# Patient Record
Sex: FEMALE | Race: ASIAN | NOT HISPANIC OR LATINO | URBAN - METROPOLITAN AREA
[De-identification: names, ages, dates, MRNs, and addresses within clinical notes are randomized per-mention and may not be internally consistent; named-entity substitution may affect disease eponyms.]

---

## 2024-08-30 ENCOUNTER — EMERGENCY (EMERGENCY)
Facility: HOSPITAL | Age: 49
LOS: 1 days | Discharge: HOME | End: 2024-08-30
Attending: STUDENT IN AN ORGANIZED HEALTH CARE EDUCATION/TRAINING PROGRAM | Admitting: STUDENT IN AN ORGANIZED HEALTH CARE EDUCATION/TRAINING PROGRAM
Payer: SELF-PAY

## 2024-08-30 VITALS
WEIGHT: 160.06 LBS | DIASTOLIC BLOOD PRESSURE: 82 MMHG | OXYGEN SATURATION: 100 % | RESPIRATION RATE: 18 BRPM | SYSTOLIC BLOOD PRESSURE: 146 MMHG | HEART RATE: 100 BPM | TEMPERATURE: 98 F

## 2024-08-30 PROCEDURE — 99283 EMERGENCY DEPT VISIT LOW MDM: CPT
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
## 2024-08-30 NOTE — ED ADULT TRIAGE NOTE - PRO INTERPRETER NEED 2
English Dermal Autograft Text: The defect edges were debeveled with a #15 scalpel blade.  Given the location of the defect, shape of the defect and the proximity to free margins a dermal autograft was deemed most appropriate.  Using a sterile surgical marker, the primary defect shape was transferred to the donor site. The area thus outlined was incised deep to adipose tissue with a #15 scalpel blade.  The harvested graft was then trimmed of adipose and epidermal tissue until only dermis was left.  The skin graft was then placed in the primary defect and oriented appropriately.
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
## 2024-08-30 NOTE — ED PROVIDER NOTE - CONSTITUTIONAL, MLM
normal... Well appearing, awake, alert, oriented to person, place, time/situation and in no apparent distress. No signs of basilar skull fracture, no step offs

## 2024-08-30 NOTE — ED PROVIDER NOTE - PHYSICAL EXAMINATION
head: No septal hematoma, TM x2 clear w/o e/o hemotypanum or CSF rhinorrhea, no greco sign, raccoon eyes or csf rhinorrhea, no e/o entrapment, no diplopia on EOM, PERRL, EOMI. No facial tenderness over zygoma, mandible or maxilla. TMJ intact. Midface stable. Nose midline without significant deviation. No ML C-spine TTP

## 2024-08-30 NOTE — ED PROVIDER NOTE - CARE PLAN
Principal Discharge DX:	Abrasion of forehead   1 Principal Discharge DX:	Abrasion of forehead  Secondary Diagnosis:	Closed head injury

## 2024-08-30 NOTE — ED PROVIDER NOTE - SKIN WOUND TYPE
3 cm curvilinear laceration to forehead, not actively bleeding, well approximated/ABRASION(S) 3 cm curvilinear superficial laceration to forehead, not actively bleeding, well approximated/ABRASION(S)
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
## 2024-08-30 NOTE — ED PROVIDER NOTE - NSFOLLOWUPINSTRUCTIONS_ED_ALL_ED_FT
You have been evaluated in the Emergency Department today for Skin abrasion of your forehead.    Please follow up with your primary care physician within two days.    Return to the Emergency Department if you experience worsening headache, numbness/tingling/difficulty with speech, facial droop, or any other concerning symptoms.     You make take Tylenol at home as appropriate for headache.

## 2024-08-30 NOTE — ED PROVIDER NOTE - PATIENT PORTAL LINK FT
You can access the FollowMyHealth Patient Portal offered by [facility] by registering at the following website: [email or web address]. By joining [employer]’s FollowMyHealth portal, you will also be able to view your health information using other applications (apps) compatible with our system.

## 2024-08-30 NOTE — ED ADULT TRIAGE NOTE - PATIENT ON (OXYGEN DELIVERY METHOD)
Pt was able to answer all orientation questions. Writer and Ryan Morales RN asked pt orientation questions and all answered appropriately. Pt attempted to sign and is very weak. Was able to sign scribbles on consent. Blood consent signed and in chart. room air

## 2024-08-30 NOTE — ED PROVIDER NOTE - ENMT, MLM
Airway patent, Nasal mucosa clear. Mouth with normal mucosa. Throat has no vesicles, no oropharyngeal exudates and uvula is midline. No signs of hemotympanum